# Patient Record
Sex: MALE | URBAN - METROPOLITAN AREA
[De-identification: names, ages, dates, MRNs, and addresses within clinical notes are randomized per-mention and may not be internally consistent; named-entity substitution may affect disease eponyms.]

---

## 2023-09-06 ENCOUNTER — OUT OF OFFICE VISIT (OUTPATIENT)
Dept: URBAN - METROPOLITAN AREA MEDICAL CENTER 16 | Facility: MEDICAL CENTER | Age: 28
End: 2023-09-06
Payer: SELF-PAY

## 2023-09-06 DIAGNOSIS — R74.8 ABNORMAL ALKALINE PHOSPHATASE TEST: ICD-10-CM

## 2023-09-06 DIAGNOSIS — R79.89 ABNORMAL BILIRUBIN TEST: ICD-10-CM

## 2023-09-06 DIAGNOSIS — R93.2 ABN FIND-BILIARY TRACT: ICD-10-CM

## 2023-09-06 PROCEDURE — 99254 IP/OBS CNSLTJ NEW/EST MOD 60: CPT | Performed by: INTERNAL MEDICINE

## 2023-09-07 ENCOUNTER — OUT OF OFFICE VISIT (OUTPATIENT)
Dept: URBAN - METROPOLITAN AREA MEDICAL CENTER 16 | Facility: MEDICAL CENTER | Age: 28
End: 2023-09-07

## 2023-09-14 ENCOUNTER — CLAIMS CREATED FROM THE CLAIM WINDOW (OUTPATIENT)
Dept: URBAN - METROPOLITAN AREA MEDICAL CENTER 33 | Facility: MEDICAL CENTER | Age: 28
End: 2023-09-14
Payer: SELF-PAY

## 2023-09-14 DIAGNOSIS — R74.8 ABNORMAL LEVELS OF OTHER SERUM ENZYMES: ICD-10-CM

## 2023-09-14 DIAGNOSIS — D64.89 ANEMIA DUE TO OTHER CAUSE: ICD-10-CM

## 2023-09-14 DIAGNOSIS — K85.80 ACUTE VIRAL PANCREATITIS: ICD-10-CM

## 2023-09-14 DIAGNOSIS — R79.89 ABNORMAL BILIRUBIN TEST: ICD-10-CM

## 2023-09-14 DIAGNOSIS — R74.01 ABNORMAL/ELEVATED TRANSAMINASE (SGOT, AMINOTRANSFERASE): ICD-10-CM

## 2023-09-14 PROCEDURE — 99254 IP/OBS CNSLTJ NEW/EST MOD 60: CPT | Performed by: INTERNAL MEDICINE

## 2023-09-14 PROCEDURE — 99254 IP/OBS CNSLTJ NEW/EST MOD 60: CPT | Performed by: PHYSICIAN ASSISTANT

## 2023-09-15 ENCOUNTER — CLAIMS CREATED FROM THE CLAIM WINDOW (OUTPATIENT)
Dept: URBAN - METROPOLITAN AREA MEDICAL CENTER 33 | Facility: MEDICAL CENTER | Age: 28
End: 2023-09-15
Payer: SELF-PAY

## 2023-09-15 DIAGNOSIS — R74.8 ABNORMAL LEVELS OF OTHER SERUM ENZYMES: ICD-10-CM

## 2023-09-15 DIAGNOSIS — R74.01 ABNORMAL/ELEVATED TRANSAMINASE (SGOT, AMINOTRANSFERASE): ICD-10-CM

## 2023-09-15 DIAGNOSIS — Z21 ASYMPTOMATIC HIV INFECTION: ICD-10-CM

## 2023-09-15 DIAGNOSIS — R79.89 ABNORMAL BILIRUBIN TEST: ICD-10-CM

## 2023-09-15 DIAGNOSIS — K85.80 ACUTE VIRAL PANCREATITIS: ICD-10-CM

## 2023-09-15 PROCEDURE — 99232 SBSQ HOSP IP/OBS MODERATE 35: CPT | Performed by: PHYSICIAN ASSISTANT

## 2023-09-15 PROCEDURE — 99232 SBSQ HOSP IP/OBS MODERATE 35: CPT | Performed by: INTERNAL MEDICINE

## 2023-10-04 ENCOUNTER — DASHBOARD ENCOUNTERS (OUTPATIENT)
Age: 28
End: 2023-10-04

## 2023-10-04 ENCOUNTER — OFFICE VISIT (OUTPATIENT)
Dept: URBAN - METROPOLITAN AREA CLINIC 105 | Facility: CLINIC | Age: 28
End: 2023-10-04

## 2023-10-04 NOTE — HPI-TODAY'S VISIT:
Labs 9/14/23 - CBC normal except hgb 12.8. CMP normal except sodium 134, alk phos 309, albumin 3.2, total bilirubin 9.2, direct bilirubin 5.5, bilirubin indirect 3.4. AMA 76.3, AFP 0.7. 9/13/23 - AST 44, bilirubin 11, lipase 253, alk phos 309, CMV IgG positive, CMV IgM 48.8, AMA 68.9. CMV PCR, ELIEL all negative. INR normal. 9/6/23 - CBC normal except hgb 13.6. CMP normal except sodium 132, BUN 6, albumin 3.4, bilirubin 10.6.